# Patient Record
Sex: MALE | Race: OTHER | NOT HISPANIC OR LATINO
[De-identification: names, ages, dates, MRNs, and addresses within clinical notes are randomized per-mention and may not be internally consistent; named-entity substitution may affect disease eponyms.]

---

## 2018-01-25 PROBLEM — Z00.00 ENCOUNTER FOR PREVENTIVE HEALTH EXAMINATION: Status: ACTIVE | Noted: 2018-01-25

## 2018-02-08 ENCOUNTER — APPOINTMENT (OUTPATIENT)
Dept: PLASTIC SURGERY | Facility: CLINIC | Age: 58
End: 2018-02-08
Payer: MEDICARE

## 2018-02-08 VITALS — WEIGHT: 210 LBS | BODY MASS INDEX: 31.1 KG/M2 | HEIGHT: 69 IN

## 2018-02-08 DIAGNOSIS — Z87.19 PERSONAL HISTORY OF OTHER DISEASES OF THE DIGESTIVE SYSTEM: ICD-10-CM

## 2018-02-08 DIAGNOSIS — Z87.39 PERSONAL HISTORY OF OTHER DISEASES OF THE MUSCULOSKELETAL SYSTEM AND CONNECTIVE TISSUE: ICD-10-CM

## 2018-02-08 DIAGNOSIS — M54.2 CERVICALGIA: ICD-10-CM

## 2018-02-08 DIAGNOSIS — M79.642 PAIN IN RIGHT HAND: ICD-10-CM

## 2018-02-08 DIAGNOSIS — M79.641 PAIN IN RIGHT HAND: ICD-10-CM

## 2018-02-08 DIAGNOSIS — Z86.79 PERSONAL HISTORY OF OTHER DISEASES OF THE CIRCULATORY SYSTEM: ICD-10-CM

## 2018-02-08 PROCEDURE — 99203 OFFICE O/P NEW LOW 30 MIN: CPT

## 2018-02-08 RX ORDER — MELOXICAM 15 MG/1
15 TABLET ORAL
Refills: 0 | Status: ACTIVE | COMMUNITY

## 2018-02-08 RX ORDER — LOSARTAN POTASSIUM 50 MG/1
50 TABLET, FILM COATED ORAL
Refills: 0 | Status: ACTIVE | COMMUNITY

## 2018-02-08 RX ORDER — GABAPENTIN 300 MG/1
300 CAPSULE ORAL
Refills: 0 | Status: ACTIVE | COMMUNITY

## 2018-02-08 RX ORDER — PANTOPRAZOLE 40 MG/1
40 TABLET, DELAYED RELEASE ORAL
Refills: 0 | Status: ACTIVE | COMMUNITY

## 2018-02-08 RX ORDER — METOPROLOL TARTRATE 25 MG/1
25 TABLET, FILM COATED ORAL
Refills: 0 | Status: ACTIVE | COMMUNITY

## 2020-01-08 ENCOUNTER — TRANSCRIPTION ENCOUNTER (OUTPATIENT)
Age: 60
End: 2020-01-08

## 2022-05-05 ENCOUNTER — NON-APPOINTMENT (OUTPATIENT)
Age: 62
End: 2022-05-05

## 2022-09-04 ENCOUNTER — EMERGENCY (EMERGENCY)
Facility: HOSPITAL | Age: 62
LOS: 0 days | Discharge: HOME | End: 2022-09-04
Attending: STUDENT IN AN ORGANIZED HEALTH CARE EDUCATION/TRAINING PROGRAM | Admitting: STUDENT IN AN ORGANIZED HEALTH CARE EDUCATION/TRAINING PROGRAM

## 2022-09-04 VITALS
OXYGEN SATURATION: 100 % | HEIGHT: 69 IN | SYSTOLIC BLOOD PRESSURE: 170 MMHG | RESPIRATION RATE: 18 BRPM | TEMPERATURE: 98 F | WEIGHT: 214.95 LBS | HEART RATE: 73 BPM | DIASTOLIC BLOOD PRESSURE: 90 MMHG

## 2022-09-04 DIAGNOSIS — K08.89 OTHER SPECIFIED DISORDERS OF TEETH AND SUPPORTING STRUCTURES: ICD-10-CM

## 2022-09-04 DIAGNOSIS — I10 ESSENTIAL (PRIMARY) HYPERTENSION: ICD-10-CM

## 2022-09-04 DIAGNOSIS — K02.9 DENTAL CARIES, UNSPECIFIED: ICD-10-CM

## 2022-09-04 PROCEDURE — 99284 EMERGENCY DEPT VISIT MOD MDM: CPT

## 2022-09-04 RX ORDER — AMOXICILLIN 250 MG/5ML
1 SUSPENSION, RECONSTITUTED, ORAL (ML) ORAL
Qty: 20 | Refills: 0
Start: 2022-09-04 | End: 2022-09-13

## 2022-09-04 NOTE — ED PROVIDER NOTE - NS ED ATTENDING STATEMENT MOD
This was a shared visit with the MICHEL. I reviewed and verified the documentation and independently performed the documented:

## 2022-09-04 NOTE — ED PROVIDER NOTE - PROGRESS NOTE DETAILS
Spoke with dental who will come down to see the patient. Pt has been seen by dental resident who performed nerve block. Noticed gingiva inflammation and would like pt to return to the dental clinic on Wednesday afternoon for follow up. Will send abx to pharmacy. Pt is stable for discharge.

## 2022-09-04 NOTE — CONSULT NOTE ADULT - ASSESSMENT
Patient is a 61y old  Male who presents with a chief complaint of lower right dental pain    HPI: 61-year-old male with past medical history of hypertension who presents with lower right tooth ache that started today morning.  [place] biopsy done for anterior maxillary nasopalatine cystic lesion on 6/8/22. Denies fever, abscess, drainage, tongue swelling, shortness of breath, and other symptoms.  Reports that he has been taking Ibuprofen 800 mg for his pain once today, but they have not provide him with much relief.      PAST MEDICAL & SURGICAL HISTORY:    (  - ) heart valve replacement  (  - ) joint replacement  ( -  ) pregnancy    MEDICATIONS  (STANDING):    MEDICATIONS  (PRN):      Allergies : none       Intolerances        FAMILY HISTORY:      *SOCIAL HISTORY: ( -  ) Tobacco; (  - ) ETOH    *Last Dental Visit:    Vital Signs Last 24 Hrs  T(C): 36.6 (04 Sep 2022 18:37), Max: 36.6 (04 Sep 2022 18:37)  T(F): 97.8 (04 Sep 2022 18:37), Max: 97.8 (04 Sep 2022 18:37)  HR: 73 (04 Sep 2022 18:37) (73 - 73)  BP: 170/90 (04 Sep 2022 18:37) (170/90 - 170/90)  BP(mean): --  RR: 18 (04 Sep 2022 18:37) (18 - 18)  SpO2: 100% (04 Sep 2022 18:37) (100% - 100%)    Parameters below as of 04 Sep 2022 18:37  Patient On (Oxygen Delivery Method): room air        LABS:                  EOE:  TMJ (  - ) clicks                     ( -  ) pops                     (  - ) crepitus             Mandible <<FROM>>             Facial bones and MOM <<grossly intact>>             (  - ) trismus             (  - ) lymphadenopathy             ( -  ) swelling             (  - ) asymmetry             ( -  ) palpation             ( -  ) dyspnea             ( -  ) dysphagia             ( -  ) loss of consciousness    IOE:  <<permanent>> dentition: <<multiple missing teeth>>           hard/soft palate:  ( -  ) palatal torus, <<No pathology noted>>           tongue/FOM <<Patient has bilateral white line on side of the tongue. Does not rub off using 2x2. Susceptible pathology >>           labial/buccal mucosa <<No pathology noted>>           (  + ) percussion           ( +  ) palpation           ( -  ) swelling            (  - ) abscess           ( -  ) sinus tract    Dentition present: <<yes   >>  Mobility: << yes >>  Caries: <<yes   >>         *DENTAL RADIOGRAPHS: N/A    RADIOLOGY & ADDITIONAL STUDIES: N/A    *ASSESSMENT: Reports that [place] and had biopsy done for anterior maxillary nasopalatine cystic lesion on 6/8/22. 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 Reports that he had a dental cleaning 2 days piror but heavy plaque and calculus noted. Patient stated pain started today morning in the lower right and has not had any food today. Denies difficulty swallowing but pain to palpation to the area. Reports that he has taken Ibuprofen 800 mg this morning but did not help with pain. Upon clinical exam, patient's tongue has bilateral white line on side of the tongue. Does not rub off using 2x2, susceptible pathology. Discoloration of gingival noted on the area of the #7. Patient was told to follow up at [facility] dental clinic for lower right  and tongue. Patient acknowledged.         *PLAN: Amoxicillin 500 mg and Ibuprofen 600 mg for infection/pain control. Follow up at [facility] dental [facility] Wednesday afternoon with oral surgeon    PROCEDURE:   Verbal and written consent given.  Anesthesia: << 2 carpules of 0.5% Marcaine with 1:200,000 epinephrine via right inferior alveolar nerve block and 1 carpule of 2% lidocaine with 1:100,000 epinephrine via local infiltration >>   Treatment: << Emergency exam done. Right inferior alveolar nerve block given. Patient stated he felt instant relief of pain. Patient was told to take Amoxicillin 500 mg and Ibuprofen 600 mg for infection/pain control. Follow up at [facility] dental clinic Wednesday afternoon with oral surgeon  >>     RECOMMENDATIONS:  1) << Amoxicillin 500 mg and Ibuprofen 600 mg for infection/pain control. Follow up at [facility] dental [facility] Wednesday afternoon with oral surgeon >>  2) Dental F/U with outpatient dentist for comprehensive dental care.   3) If any difficulty swallowing/breathing, fever occur, return to ER.     Resident Name, pager # [phone], DDS [phone]

## 2022-09-04 NOTE — ED PROVIDER NOTE - NS ED ROS FT
Constitutional: Negative for fever, chills, and fatigue.  HENT: Negative for headache  Mouth: + toothache.   Cardiovascular: Negative for chest pain  Respiratory: Negative for SOB

## 2022-09-04 NOTE — ED PROVIDER NOTE - NSFOLLOWUPCLINICS_GEN_ALL_ED_FT
[facility]  Dental  [address] [place] [id number]  Phone: [phone]  Fax:   Scheduled Appointment: 9/7/2022

## 2022-09-04 NOTE — ED PROVIDER NOTE - PHYSICAL EXAMINATION
CONSTITUTIONAL: in no apparent distress.   HEAD: Normocephalic; atraumatic.   ENT: + tooth #8 extraction noticed; ulcer noticed in the gingiva near tooth #8. Hearing is intact with good acuity to spoken voice. Patient is speaking clearly, not muffled and airway is intact.   RESPIRATORY: No signs of respiratory distress.   CARDIOVASCULAR: Regular rate and rhythm.   NEURO: A & O x 3. Normal speech.   PSYCHOLOGICAL: Appropriate mood and affect. Good judgement and insight. CONSTITUTIONAL: in no apparent distress.   HEAD: Normocephalic; atraumatic.   ENT: + tooth #30 with no erythema or abscess or drainage. Hearing is intact with good acuity to spoken voice. Patient is speaking clearly, not muffled and airway is intact.   RESPIRATORY: No signs of respiratory distress.   CARDIOVASCULAR: Regular rate and rhythm.   NEURO: A & O x 3. Normal speech.   PSYCHOLOGICAL: Appropriate mood and affect. Good judgement and insight.

## 2022-09-04 NOTE — ED PROVIDER NOTE - OBJECTIVE STATEMENT
61-year-old male with past medical history of hypertension who presents with right upper tooth ache that started several days ago.  Reports that [place] and [place] his tooth [place].  Reports that started noticing pain soon after the extraction.  Denies fever, abscess, drainage, tongue swelling, shortness of breath, and other symptoms.  Reports that he has been taking Motrin and Tylenol for his pain, but they have not provide him with much relief. 61-year-old male with past medical history of hypertension who presents with right lower tooth ache that started several days ago.  Reports that [place] and had his tooth [place].  Reports that started noticing pain soon after the extraction.  Denies fever, abscess, drainage, tongue swelling, shortness of breath, and other symptoms.  Reports that he has been taking Motrin and Tylenol for his pain, but they have not provide him with much relief.

## 2022-09-04 NOTE — ED PROVIDER NOTE - CLINICAL SUMMARY MEDICAL DECISION MAKING FREE TEXT BOX
61-year-old male with past medical history hypertension presents to the ER for pain to the extraction site from his tooth that was removed on July 30 in [place].  He reports that he bit down today and felt pain to the area.  Denies fever, vomiting. no fever, voice change, inability to tolerate po. pt well appearing, mmm, + ttp with dental carries to extraction site #3, uvula midline, no PTA, no tongue elevation or ttp floor of mouth, no lymphadenopathy. Offered analgesia and follow-up in dental clinic on Tuesday.

## 2022-09-04 NOTE — ED ADULT NURSE NOTE - OBJECTIVE STATEMENT
61M with pain on right side of mouth.  He had a cyst diagnosed right of his nose.  He now has pain in that area radiating to the upper right side of his mouth.  He has not had any fevers or chills.  He has been taking OTC pain medication but it is not effective.  He does not report any drainage or bleeding.  Pain is improved somewhat when he clenches his jaw shut.  On exam. patient is alert and oriented with a patent airway and no difficulty swallowing.  No respiratory distress.  No facial swelling noted.  No other complaints.

## 2022-09-04 NOTE — ED PROVIDER NOTE - PATIENT PORTAL LINK FT
You can access the FollowMyHealth Patient Portal offered by [facility] by registering at the following website: [email or web address]. By joining [facility]’s FollowMyHealth portal, you will also be able to view your health information using other applications (apps) compatible with our system.

## 2022-09-04 NOTE — ED PROVIDER NOTE - NSFOLLOWUPINSTRUCTIONS_ED_ALL_ED_FT
Please make sure to return to the dental clinic on 09/07/22 between 12PM and 4PM for follow up.     Toothache    WHAT YOU NEED TO KNOW:    What is a toothache and what causes it? A toothache is pain that is caused by irritation of the nerves in the center of your tooth. The irritation may be caused by several problems, such as a cavity, an infection, a cracked tooth, or gum disease.  Tooth Anatomy         How is a toothache diagnosed? Your healthcare provider will ask how long you have had a toothache. He or she will also ask if you have any other symptoms or medical conditions. Your healthcare provider will examine your tooth and mouth. Your provider may also examine your face and neck. You may need x-rays to check for an infection or cracked tooth.     How is a toothache treated? Treatment depends on the cause of your toothache. You may need any of the following:   •NSAIDs, such as ibuprofen, help decrease swelling, pain, and fever. This medicine is available with or without a doctor's order. NSAIDs can cause stomach bleeding or kidney problems in certain people. If you take blood thinner medicine, always ask if NSAIDs are safe for you. Always read the medicine label and follow directions. Do not give these medicines to children younger than 6 months without direction from a healthcare provider.      •Acetaminophen decreases pain and fever. It is available without a doctor's order. Ask how much to take and how often to take it. Follow directions. Acetaminophen can cause liver damage if not taken correctly.      •Prescription pain medicine may be given. Ask your healthcare provider how to take this medicine safely. Some prescription pain medicines contain acetaminophen. Do not take other medicines that contain acetaminophen without talking to your healthcare provider. Too much acetaminophen may cause liver damage. Prescription pain medicine may cause constipation. Ask your healthcare provider how to prevent or treat constipation.       •Antibiotics help treat or prevent a bacterial infection.       How can I manage my toothache?   •Rinse your mouth with warm salt water 4 times a day or as directed.       •Eat soft foods to help relieve pain caused by chewing.       •Apply ice on your jaw or cheek for 15 to 20 minutes every hour or as directed. Use an ice pack, or put crushed ice in a plastic bag. Cover it with a towel before you apply it. Ice helps prevent tissue damage and decreases swelling and pain.      How can I help prevent a toothache?   •Brush your teeth at least 2 times a day.      •Use dental floss to clean between your teeth at least 1 time a day.      •See your dentist regularly every 6 months for dental cleanings and oral exams.      When should I seek immediate care?   •You have trouble breathing or swallowing.       •You have swelling in your face or neck.       When should I contact my dentist?   •You have a fever and chills.       •You have trouble opening or closing your mouth.       •You have swelling around your tooth.       •You have questions or concerns about your condition or care.      CARE AGREEMENT:    You have the right to help plan your care. Learn about your health condition and how it may be treated. Discuss treatment options with your healthcare providers to decide what care you want to receive. You always have the right to refuse treatment.

## 2023-07-30 ENCOUNTER — NON-APPOINTMENT (OUTPATIENT)
Age: 63
End: 2023-07-30

## 2023-10-27 ENCOUNTER — NON-APPOINTMENT (OUTPATIENT)
Age: 63
End: 2023-10-27

## 2023-10-27 ENCOUNTER — APPOINTMENT (OUTPATIENT)
Dept: NEUROLOGY | Facility: CLINIC | Age: 63
End: 2023-10-27
Payer: MEDICARE

## 2023-10-27 VITALS
HEART RATE: 71 BPM | BODY MASS INDEX: 31.7 KG/M2 | SYSTOLIC BLOOD PRESSURE: 161 MMHG | WEIGHT: 214 LBS | HEIGHT: 69 IN | DIASTOLIC BLOOD PRESSURE: 89 MMHG

## 2023-10-27 DIAGNOSIS — G50.0 TRIGEMINAL NEURALGIA: ICD-10-CM

## 2023-10-27 PROCEDURE — 99203 OFFICE O/P NEW LOW 30 MIN: CPT

## 2023-10-27 RX ORDER — CARBAMAZEPINE 200 MG/1
200 TABLET ORAL
Refills: 0 | Status: ACTIVE | COMMUNITY

## 2023-12-20 ENCOUNTER — NON-APPOINTMENT (OUTPATIENT)
Age: 63
End: 2023-12-20

## 2024-09-10 ENCOUNTER — APPOINTMENT (OUTPATIENT)
Dept: NEUROLOGY | Facility: CLINIC | Age: 64
End: 2024-09-10

## 2024-12-22 ENCOUNTER — NON-APPOINTMENT (OUTPATIENT)
Age: 64
End: 2024-12-22

## 2025-03-30 ENCOUNTER — NON-APPOINTMENT (OUTPATIENT)
Age: 65
End: 2025-03-30